# Patient Record
Sex: FEMALE | Race: WHITE | NOT HISPANIC OR LATINO | Employment: FULL TIME | URBAN - METROPOLITAN AREA
[De-identification: names, ages, dates, MRNs, and addresses within clinical notes are randomized per-mention and may not be internally consistent; named-entity substitution may affect disease eponyms.]

---

## 2019-11-05 PROBLEM — N84.0 ENDOMETRIAL POLYP: Status: ACTIVE | Noted: 2019-11-05

## 2020-03-10 PROBLEM — I10 ESSENTIAL HYPERTENSION: Status: ACTIVE | Noted: 2020-03-10

## 2021-01-13 ENCOUNTER — OFFICE VISIT (OUTPATIENT)
Dept: URGENT CARE | Facility: CLINIC | Age: 46
End: 2021-01-13
Payer: COMMERCIAL

## 2021-01-13 VITALS
OXYGEN SATURATION: 98 % | HEIGHT: 68 IN | RESPIRATION RATE: 16 BRPM | DIASTOLIC BLOOD PRESSURE: 90 MMHG | WEIGHT: 146 LBS | TEMPERATURE: 98 F | HEART RATE: 80 BPM | BODY MASS INDEX: 22.13 KG/M2 | SYSTOLIC BLOOD PRESSURE: 150 MMHG

## 2021-01-13 DIAGNOSIS — R05.9 COUGH: ICD-10-CM

## 2021-01-13 DIAGNOSIS — U07.1 COVID-19 VIRUS INFECTION: Primary | ICD-10-CM

## 2021-01-13 DIAGNOSIS — U07.1 COVID-19 VIRUS DETECTED: ICD-10-CM

## 2021-01-13 LAB
CTP QC/QA: YES
SARS-COV-2 RDRP RESP QL NAA+PROBE: POSITIVE

## 2021-01-13 PROCEDURE — U0002: ICD-10-PCS | Mod: QW,S$GLB,, | Performed by: PHYSICIAN ASSISTANT

## 2021-01-13 PROCEDURE — 99214 OFFICE O/P EST MOD 30 MIN: CPT | Mod: S$GLB,,, | Performed by: PHYSICIAN ASSISTANT

## 2021-01-13 PROCEDURE — 99214 PR OFFICE/OUTPT VISIT, EST, LEVL IV, 30-39 MIN: ICD-10-PCS | Mod: S$GLB,,, | Performed by: PHYSICIAN ASSISTANT

## 2021-01-13 PROCEDURE — U0002 COVID-19 LAB TEST NON-CDC: HCPCS | Mod: QW,S$GLB,, | Performed by: PHYSICIAN ASSISTANT

## 2021-05-10 ENCOUNTER — PATIENT MESSAGE (OUTPATIENT)
Dept: RESEARCH | Facility: HOSPITAL | Age: 46
End: 2021-05-10

## 2024-11-24 ENCOUNTER — OFFICE VISIT (OUTPATIENT)
Dept: URGENT CARE | Facility: CLINIC | Age: 49
End: 2024-11-24
Payer: COMMERCIAL

## 2024-11-24 VITALS
WEIGHT: 156 LBS | OXYGEN SATURATION: 98 % | TEMPERATURE: 97 F | DIASTOLIC BLOOD PRESSURE: 102 MMHG | HEIGHT: 68 IN | RESPIRATION RATE: 18 BRPM | BODY MASS INDEX: 23.64 KG/M2 | SYSTOLIC BLOOD PRESSURE: 163 MMHG | HEART RATE: 75 BPM

## 2024-11-24 DIAGNOSIS — I10 ESSENTIAL HYPERTENSION: ICD-10-CM

## 2024-11-24 DIAGNOSIS — R06.2 WHEEZE: Primary | ICD-10-CM

## 2024-11-24 RX ORDER — ALBUTEROL SULFATE 90 UG/1
2 INHALANT RESPIRATORY (INHALATION) EVERY 6 HOURS PRN
Qty: 18 G | Refills: 0 | Status: SHIPPED | OUTPATIENT
Start: 2024-11-24 | End: 2025-11-24

## 2024-11-24 NOTE — PROGRESS NOTES
"Subjective:      Patient ID: Vannesa Box is a 49 y.o. female.    Vitals:  height is 5' 8" (1.727 m) and weight is 70.8 kg (156 lb). Her temperature is 97.2 °F (36.2 °C). Her blood pressure is 163/102 (abnormal) and her pulse is 75. Her respiration is 18 and oxygen saturation is 98%.     Chief Complaint: Cough    Ambulatory to room with complaint of wheezing last night, states has been pulling up carpet in her house.  Denies history of asthma or COPD.    Cough  This is a new problem. The current episode started yesterday. The problem has been gradually worsening. The cough is Productive of blood-tinged sputum. Associated symptoms include wheezing. Pertinent negatives include no fever.       Constitution: Negative for fever.   Respiratory:  Positive for wheezing.       Objective:     Physical Exam   Constitutional: She is oriented to person, place, and time. She appears well-developed. She is cooperative.  Non-toxic appearance. She does not appear ill. No distress.   HENT:   Head: Normocephalic and atraumatic.   Ears:   Right Ear: Hearing and external ear normal.   Left Ear: Hearing and external ear normal.   Nose: Nose normal. No mucosal edema, rhinorrhea, nasal deformity or congestion. No epistaxis. Right sinus exhibits no maxillary sinus tenderness and no frontal sinus tenderness. Left sinus exhibits no maxillary sinus tenderness and no frontal sinus tenderness.   Mouth/Throat: Uvula is midline, oropharynx is clear and moist and mucous membranes are normal. No trismus in the jaw. Normal dentition. No uvula swelling. No oropharyngeal exudate, posterior oropharyngeal edema or posterior oropharyngeal erythema.   Eyes: Conjunctivae and lids are normal. No scleral icterus.   Neck: Trachea normal and phonation normal. Neck supple. No edema present. No erythema present. No neck rigidity present.   Cardiovascular: Normal rate, regular rhythm, normal heart sounds and normal pulses.   Pulmonary/Chest: Effort normal and " breath sounds normal. No stridor. No respiratory distress. She has no decreased breath sounds. She has no wheezes. She has no rhonchi. She has no rales.   Abdominal: Normal appearance.   Musculoskeletal: Normal range of motion.         General: No deformity. Normal range of motion.   Neurological: She is alert and oriented to person, place, and time. She exhibits normal muscle tone. Coordination normal.   Skin: Skin is warm, dry, intact, not diaphoretic and not pale.   Psychiatric: Her speech is normal and behavior is normal. Judgment and thought content normal.   Nursing note and vitals reviewed.      Assessment:     1. Wheeze    2. Essential hypertension        Plan:       Wheeze    Essential hypertension    Other orders  -     albuterol (VENTOLIN HFA) 90 mcg/actuation inhaler; Inhale 2 puffs into the lungs every 6 (six) hours as needed for Wheezing. Rescue  Dispense: 18 g; Refill: 0           Patient is no longer wheezing.  Discussed hypertension, patient states is compliant with medication, I recommended she maintain a blood pressure log and present to primary for further evaluation.  - To ED for any new or acutely worsening symptoms including but not limited to chest pain, palpitations, shortness of breath, or fever greater than 103° F.  Patient in agreement with plan of care.    - The diagnosis, treatment plan, instructions for follow-up and reevaluation as well as ED precautions were discussed and understanding was verbalized. All questions or concerns have been addressed.  -Follow up with your primary care provider for continued evaluation and management.

## 2025-03-19 ENCOUNTER — OFFICE VISIT (OUTPATIENT)
Dept: URGENT CARE | Facility: CLINIC | Age: 50
End: 2025-03-19
Payer: COMMERCIAL

## 2025-03-19 VITALS
HEART RATE: 79 BPM | OXYGEN SATURATION: 96 % | TEMPERATURE: 97 F | DIASTOLIC BLOOD PRESSURE: 92 MMHG | RESPIRATION RATE: 16 BRPM | SYSTOLIC BLOOD PRESSURE: 148 MMHG

## 2025-03-19 DIAGNOSIS — J40 BRONCHITIS: ICD-10-CM

## 2025-03-19 DIAGNOSIS — R06.2 WHEEZING: ICD-10-CM

## 2025-03-19 DIAGNOSIS — R05.8 PRODUCTIVE COUGH: Primary | ICD-10-CM

## 2025-03-19 PROCEDURE — 99214 OFFICE O/P EST MOD 30 MIN: CPT | Mod: S$GLB,,, | Performed by: STUDENT IN AN ORGANIZED HEALTH CARE EDUCATION/TRAINING PROGRAM

## 2025-03-19 RX ORDER — ALBUTEROL SULFATE 90 UG/1
1-2 INHALANT RESPIRATORY (INHALATION) EVERY 4 HOURS PRN
Qty: 18 G | Refills: 0 | Status: SHIPPED | OUTPATIENT
Start: 2025-03-19

## 2025-03-19 RX ORDER — PREDNISONE 20 MG/1
40 TABLET ORAL DAILY
Qty: 10 TABLET | Refills: 0 | Status: SHIPPED | OUTPATIENT
Start: 2025-03-19 | End: 2025-03-24

## 2025-03-19 RX ORDER — BROMPHENIRAMINE MALEATE, PSEUDOEPHEDRINE HYDROCHLORIDE, AND DEXTROMETHORPHAN HYDROBROMIDE 2; 30; 10 MG/5ML; MG/5ML; MG/5ML
5-10 SYRUP ORAL
Qty: 118 ML | Refills: 0 | Status: SHIPPED | OUTPATIENT
Start: 2025-03-19 | End: 2025-03-29

## 2025-03-19 RX ORDER — DOXYCYCLINE HYCLATE 100 MG
100 TABLET ORAL 2 TIMES DAILY
Qty: 20 TABLET | Refills: 0 | Status: SHIPPED | OUTPATIENT
Start: 2025-03-19 | End: 2025-03-29

## 2025-03-19 RX ORDER — PROMETHAZINE HYDROCHLORIDE AND DEXTROMETHORPHAN HYDROBROMIDE 6.25; 15 MG/5ML; MG/5ML
5 SYRUP ORAL NIGHTLY PRN
Qty: 118 ML | Refills: 0 | Status: SHIPPED | OUTPATIENT
Start: 2025-03-19

## 2025-03-19 NOTE — PROGRESS NOTES
Subjective:      Patient ID: Vannesa Box is a 49 y.o. female.    Vitals:  temperature is 97.3 °F (36.3 °C). Her blood pressure is 148/92 (abnormal) and her pulse is 79. Her respiration is 16 and oxygen saturation is 96%.     Chief Complaint: Cough    Patient is a 49-year-old female with a past medical history anxiety, depression, and hypertension who presents to clinic for evaluation of cough.  Patient reports she is not vaccinated.  Patient reports no recent or known sick exposures.  Patient reports over-the-counter Mucinex DM with mild temporary relief to symptoms.  Patient states she has experienced a productive cough.  Patient states feeling that she has a rattle in her chest or wheezing from time to time.  Patient states she has not had any fever or chills, ear pain or sore throat, nasal congestion, chest pain or palpitations, shortness of breath, dyspnea on exertion, orthopnea, abdominal pain, nausea or vomiting, diarrhea, urinary symptoms, body aches, headaches or dizziness, or change in mentation.    Cough  Associated symptoms include wheezing. Pertinent negatives include no chest pain, chills, ear pain, fever, headaches, myalgias, rash, sore throat or shortness of breath.       Constitution: Negative. Negative for chills, sweating, fatigue and fever.   HENT: Negative.  Negative for ear pain, congestion and sore throat.    Neck: neck negative.   Cardiovascular: Negative.  Negative for chest pain and palpitations.   Eyes: Negative.    Respiratory:  Positive for cough, sputum production and wheezing. Negative for chest tightness and shortness of breath.    Gastrointestinal: Negative.  Negative for abdominal pain, nausea, vomiting and diarrhea.   Endocrine: negative.   Genitourinary: Negative.  Negative for dysuria, frequency and urgency.   Musculoskeletal: Negative.  Negative for muscle ache.   Skin: Negative.  Negative for color change, pale, rash and erythema.   Allergic/Immunologic: Negative.     Neurological: Negative.  Negative for dizziness, light-headedness, passing out, headaches, disorientation and altered mental status.   Hematologic/Lymphatic: Negative.    Psychiatric/Behavioral: Negative.  Negative for altered mental status, disorientation and confusion.       Objective:     Physical Exam   Constitutional: She is oriented to person, place, and time. She appears well-developed. She is cooperative.  Non-toxic appearance. She does not appear ill. No distress.   HENT:   Head: Normocephalic and atraumatic.   Ears:   Right Ear: Hearing, tympanic membrane, external ear and ear canal normal.   Left Ear: Hearing, tympanic membrane, external ear and ear canal normal.   Nose: Nose normal. No mucosal edema, rhinorrhea, nasal deformity or congestion. No epistaxis. Right sinus exhibits no maxillary sinus tenderness and no frontal sinus tenderness. Left sinus exhibits no maxillary sinus tenderness and no frontal sinus tenderness.   Mouth/Throat: Uvula is midline, oropharynx is clear and moist and mucous membranes are normal. Mucous membranes are moist. No trismus in the jaw. Normal dentition. No uvula swelling. No oropharyngeal exudate or posterior oropharyngeal erythema. Oropharynx is clear.   Eyes: Conjunctivae and lids are normal. Pupils are equal, round, and reactive to light. Right eye exhibits no discharge. Left eye exhibits no discharge. No scleral icterus.   Neck: Trachea normal and phonation normal. Neck supple. No neck rigidity present.   Cardiovascular: Normal rate, regular rhythm, normal heart sounds and normal pulses.   Pulmonary/Chest: Effort normal. No respiratory distress (Equal rise and fall of chest.  Able to speak in full complete sentences.  Occasional cough.). She has no wheezes. She has rhonchi (Coarse breath sounds prominently noted with cough). She has no rales.   Abdominal: Normal appearance and bowel sounds are normal. She exhibits no distension. Soft. There is no abdominal tenderness.    Musculoskeletal: Normal range of motion.         General: Normal range of motion.      Cervical back: She exhibits no tenderness.   Lymphadenopathy:     She has no cervical adenopathy.   Neurological: She is alert and oriented to person, place, and time. She exhibits normal muscle tone.   Skin: Skin is warm, dry, intact, not diaphoretic, not pale and no rash. Capillary refill takes less than 2 seconds. No erythema   Psychiatric: Her speech is normal and behavior is normal. Judgment and thought content normal.   Nursing note and vitals reviewed.      Assessment:     1. Productive cough    2. Wheezing    3. Bronchitis        Plan:       Productive cough  -     XR CHEST PA AND LATERAL; Future; Expected date: 03/19/2025    Wheezing  -     XR CHEST PA AND LATERAL; Future; Expected date: 03/19/2025    Bronchitis  -     brompheniramine-pseudoeph-DM (BROMFED DM) 2-30-10 mg/5 mL Syrp; Take 5-10 mLs by mouth every 4 to 6 hours as needed (Cough/congestion).  Dispense: 118 mL; Refill: 0    Other orders  -     doxycycline (VIBRA-TABS) 100 MG tablet; Take 1 tablet (100 mg total) by mouth 2 (two) times daily. for 10 days  Dispense: 20 tablet; Refill: 0  -     predniSONE (DELTASONE) 20 MG tablet; Take 2 tablets (40 mg total) by mouth once daily. for 5 days  Dispense: 10 tablet; Refill: 0  -     albuterol-budesonide (AIRSUPRA) 90-80 mcg/actuation; Inhale 2 puffs into the lungs every 4 to 6 hours as needed (Shortness of breath/wheezing).  Dispense: 10.7 g; Refill: 0  -     promethazine-dextromethorphan (PROMETHAZINE-DM) 6.25-15 mg/5 mL Syrp; Take 5 mLs by mouth nightly as needed (Cough).  Dispense: 118 mL; Refill: 0                Chest x-ray: Interpreted by myself without evidence of acute cardiopulmonary process.  Take medications as prescribed.   checked through epic.  No red flags.  Tylenol/Motrin per package instructions for any pain or fever.  Assure adequate hydration and rest.  Throat lozenges or Chloraseptic per package  instructions for sore throat.    Warm salt water gargles every 2-3 hours as needed for sore throat.    Nasal saline flushes or irrigation as directed for nasal saline congestion and sinus related symptoms.  Follow-up with PCP in 1-2 days.  Return to clinic as needed.  To ED for any new or acutely worsening symptoms.  Patient in agreement with plan of care.    DISCLAIMER: Please note that my documentation in this Electronic Healthcare Record was produced using speech recognition software and therefore may contain errors related to that software system.These could include grammar, punctuation and spelling errors or the inclusion/exclusion of phrases that were not intended. Garbled syntax, mangled pronouns, and other bizarre constructions may be attributed to that software system.